# Patient Record
Sex: MALE
[De-identification: names, ages, dates, MRNs, and addresses within clinical notes are randomized per-mention and may not be internally consistent; named-entity substitution may affect disease eponyms.]

---

## 2018-05-11 ENCOUNTER — HOSPITAL ENCOUNTER (INPATIENT)
Dept: HOSPITAL 31 - C.ER | Age: 58
LOS: 3 days | Discharge: HOME | DRG: 183 | End: 2018-05-14
Attending: SPECIALIST | Admitting: SPECIALIST
Payer: MEDICAID

## 2018-05-11 DIAGNOSIS — Z79.4: ICD-10-CM

## 2018-05-11 DIAGNOSIS — E78.5: ICD-10-CM

## 2018-05-11 DIAGNOSIS — N40.0: ICD-10-CM

## 2018-05-11 DIAGNOSIS — E11.9: ICD-10-CM

## 2018-05-11 DIAGNOSIS — M54.5: ICD-10-CM

## 2018-05-11 DIAGNOSIS — F41.9: ICD-10-CM

## 2018-05-11 DIAGNOSIS — I16.9: ICD-10-CM

## 2018-05-11 DIAGNOSIS — N43.3: ICD-10-CM

## 2018-05-11 DIAGNOSIS — K57.32: Primary | ICD-10-CM

## 2018-05-11 LAB
ALBUMIN SERPL-MCNC: 4.1 G/DL (ref 3.5–5)
ALBUMIN/GLOB SERPL: 1.1 {RATIO} (ref 1–2.1)
ALT SERPL-CCNC: 36 U/L (ref 21–72)
APTT BLD: 31 SECONDS (ref 21–34)
AST SERPL-CCNC: 21 U/L (ref 17–59)
BACTERIA #/AREA URNS HPF: (no result) /[HPF]
BASOPHILS # BLD AUTO: 0 K/UL (ref 0–0.2)
BASOPHILS NFR BLD: 0.4 % (ref 0–2)
BILIRUB UR-MCNC: NEGATIVE MG/DL
BUN SERPL-MCNC: 25 MG/DL (ref 9–20)
CALCIUM SERPL-MCNC: 9.8 MG/DL (ref 8.6–10.4)
EOSINOPHIL # BLD AUTO: 0.8 K/UL (ref 0–0.7)
EOSINOPHIL NFR BLD: 6.6 % (ref 0–4)
ERYTHROCYTE [DISTWIDTH] IN BLOOD BY AUTOMATED COUNT: 13 % (ref 11.5–14.5)
GFR NON-AFRICAN AMERICAN: 57
GLUCOSE UR STRIP-MCNC: (no result) MG/DL
HGB BLD-MCNC: 15.3 G/DL (ref 12–18)
INR PPP: 1
LEUKOCYTE ESTERASE UR-ACNC: (no result) LEU/UL
LIPASE: 344 U/L (ref 23–300)
LYMPHOCYTES # BLD AUTO: 1.9 K/UL (ref 1–4.3)
LYMPHOCYTES NFR BLD AUTO: 16.6 % (ref 20–40)
MCH RBC QN AUTO: 35 PG (ref 27–31)
MCHC RBC AUTO-ENTMCNC: 34.8 G/DL (ref 33–37)
MCV RBC AUTO: 100.6 FL (ref 80–94)
MONOCYTES # BLD: 0.7 K/UL (ref 0–0.8)
MONOCYTES NFR BLD: 6.1 % (ref 0–10)
NEUTROPHILS # BLD: 8.1 K/UL (ref 1.8–7)
NEUTROPHILS NFR BLD AUTO: 70.3 % (ref 50–75)
NRBC BLD AUTO-RTO: 0.1 % (ref 0–2)
PH UR STRIP: 5 [PH] (ref 5–8)
PLATELET # BLD: 251 K/UL (ref 130–400)
PMV BLD AUTO: 10.1 FL (ref 7.2–11.7)
PROT UR STRIP-MCNC: (no result) MG/DL
PROTHROMBIN TIME: 11 SECONDS (ref 9.7–12.2)
RBC # BLD AUTO: 4.36 MIL/UL (ref 4.4–5.9)
RBC # UR STRIP: NEGATIVE /UL
SP GR UR STRIP: 1.02 (ref 1–1.03)
SQUAMOUS EPITHIAL: < 1 /HPF (ref 0–5)
UROBILINOGEN UR-MCNC: NORMAL MG/DL (ref 0.2–1)
WBC # BLD AUTO: 11.5 K/UL (ref 4.8–10.8)

## 2018-05-11 NOTE — C.PDOC
History Of Present Illness


57 year old male with a Hx of HTN presents to the ER with a complaint of left 

sided back/flank pain for the past week that has worsened today. Patient 

reports he has hematuria last week. Denies fever or other complaints.


Time Seen by Provider: 05/11/18 21:17


Chief Complaint (Nursing): Back Pain


History Per: Patient


History/Exam Limitations: no limitations


Onset/Duration Of Symptoms: Days


Current Symptoms Are (Timing): Still Present


Quality Of Discomfort: Unable To Describe


Previous Symptoms: None


Associated Symptoms: None


Exacerbating Factor(s): Nothing


Recent travel outside of the [place]: No





Past Medical History


Reviewed: Historical Data, Nursing Documentation, Vital Signs


Vital Signs: 


 Last Vital Signs











Temp  98.1 F   05/14/18 07:00


 


Pulse  88   05/14/18 07:00


 


Resp  20   05/14/18 07:00


 


BP  117/69   05/14/18 07:00


 


Pulse Ox  97   05/14/18 07:00














- Medical History


PMH: HTN, Hypercholesterolemia


Family History: States: Unknown Family Hx





- Social History


Hx Alcohol Use: No


Hx Substance Use: No





- Immunization History


Hx Tetanus Toxoid Vaccination: No


Hx Influenza Vaccination: No


Hx Pneumococcal Vaccination: No





Review Of Systems


Constitutional: Negative for: Fever


Cardiovascular: Negative for: Chest Pain, Palpitations


Respiratory: Negative for: Cough, Shortness of Breath


Genitourinary: Positive for: Hematuria


Musculoskeletal: Positive for: Back Pain (Left)





Physical Exam





- Physical Exam


Appears: Non-toxic


Skin: Normal Color, Warm, Dry


Head: Atraumatic, Normacephalic


Eye(s): bilateral: Normal Inspection


Oral Mucosa: Moist


Chest: Symmetrical, No Tenderness


Cardiovascular: Rhythm Regular


Respiratory: Normal Breath Sounds, No Rales, No Rhonchi, No Wheezing


Gastrointestinal/Abdominal: Soft, No Tenderness


Back: Other (Left sided flank tenderness)


Neurological/Psych: Oriented x3, Normal Speech





ED Course And Treatment





- Laboratory Results


Result Diagrams: 


 05/14/18 07:23





 05/14/18 07:23


O2 Sat by Pulse Oximetry: 97 (Room air)


Pulse Ox Interpretation: Normal





Medical Decision Making


Medical Decision Making: 


ro renal colic, uti- labs imaging pending





Plan:


* CT abd/pel


* Blood work


* Urinalysis


* Morphine











ct shows e/o of diverticulitis- discussedw tih pmd. persistent pain, needs iv 

pain meds, and admisison. 





Disposition





- Disposition


Disposition: HOSPITALIZED


Disposition Time: 01:00


Condition: STABLE





- Clinical Impression


Clinical Impression: 


 Sigmoid diverticulitis








- Scribe Statement


The provider has reviewed the documentation as recorded by the Scribjanie Adams





All medical record entries made by the Scribe were at my direction and 

personally dictated by me. I have reviewed the chart and agree that the record 

accurately reflects my personal performance of the history, physical exam, 

medical decision making, and the department course for this patient. I have 

also personally directed, reviewed, and agree with the discharge instructions 

and disposition.





Decision To Admit





- Pt Status Changed To:


Hospital Disposition Of: Inpatient





- Admit Certification


Admit to Inpatient:: After my assessment, the patient will require 

hospitalization for at least two midnights.  This is because of the severity of 

symptoms shown, intensity of services needed, and/or the medical risk in this 

patient being treated as an outpatient.





- InPatient:


Physician Admission Certification:: needs iv antibiotics persistent pain





- .


Bed Request Type: Regular


Admitting Physician: Diomedes Arrington


Patient Diagnosis: 


 Sigmoid diverticulitis

## 2018-05-11 NOTE — CT
EXAM:

  CT Abdomen and Pelvis Without Intravenous  Contrast



CLINICAL HISTORY:

  57 years old, male; Pain; Abdominal pain; Flank; Lower; Additional info: Abd 

pain



TECHNIQUE:

  Axial computed tomography images of the abdomen and pelvis without 

intravenous contrast.  All CT scans at this facility use one or more dose 

reduction techniques, viz.: automated exposure control; ma/kV adjustment per 

patient size (including targeted exams where dose is matched to indication; 

i.e. head); or iterative reconstruction technique.

  Coronal and sagittal reformatted images were created and reviewed.



COMPARISON:

  No relevant prior studies available.



FINDINGS:

  Lung bases:  Unremarkable.  No mass.  No consolidation.

  Heart:  Mild cardiomegaly.



 ABDOMEN:

  Liver:  Hepatic steatosis.

  Gallbladder and bile ducts:  Unremarkable.  No calcified stones.  No ductal 

dilation.

  Pancreas:  Unremarkable.  No ductal dilation.

  Spleen:  Unremarkable.  No splenomegaly.

  Adrenals:  Unremarkable.  No mass.

  Kidneys and ureters:  Unremarkable.  No obstructing stones.  No 

hydronephrosis.

  Stomach and bowel:  Sigmoid diverticulosis.  Pericolonic inflammation.



 PELVIS:

  Appendix:  No findings to suggest acute appendicitis.

  Bladder:  Nonspecific bladder wall thickening.  No stones.

  Reproductive:  Enlarged prostate.  Left hydrocele.



 ABDOMEN and PELVIS:

  Intraperitoneal space:  Unremarkable.  No free air.  No significant fluid 

collection.

  Bones/joints:  No acute fracture.  No dislocation.

  Soft tissues:  Unremarkable.

  Vasculature:  Atherosclerotic vascular disease.  No abdominal aortic aneurysm.

  Lymph nodes:  Mildly enlarged pericolonic lymph nodes.



IMPRESSION:     

1.  CT findings consistent with acute sigmoid diverticulitis.

2.  Remainder of findings as above.

## 2018-05-12 LAB
% IRON SATURATION: 32 (ref 20–55)
ALBUMIN SERPL-MCNC: 3.8 G/DL (ref 3.5–5)
ALBUMIN/GLOB SERPL: 1.2 {RATIO} (ref 1–2.1)
ALT SERPL-CCNC: 21 U/L (ref 21–72)
AST SERPL-CCNC: 27 U/L (ref 17–59)
BASOPHILS # BLD AUTO: 0 K/UL (ref 0–0.2)
BASOPHILS NFR BLD: 0.3 % (ref 0–2)
BUN SERPL-MCNC: 21 MG/DL (ref 9–20)
CALCIUM SERPL-MCNC: 8.7 MG/DL (ref 8.6–10.4)
EOSINOPHIL # BLD AUTO: 0.6 K/UL (ref 0–0.7)
EOSINOPHIL NFR BLD: 5.9 % (ref 0–4)
ERYTHROCYTE [DISTWIDTH] IN BLOOD BY AUTOMATED COUNT: 13.2 % (ref 11.5–14.5)
FERRITIN SERPL-MCNC: 72.1 NG/ML
FOLATE SERPL-MCNC: 7.6 NG/ML
GFR NON-AFRICAN AMERICAN: > 60
HDLC SERPL-MCNC: 26 MG/DL (ref 30–70)
HGB BLD-MCNC: 14.2 G/DL (ref 12–18)
IRON SERPL-MCNC: 99 UG/DL (ref 49–181)
LDLC SERPL-MCNC: 126 MG/DL (ref 0–129)
LYMPHOCYTES # BLD AUTO: 2.4 K/UL (ref 1–4.3)
LYMPHOCYTES NFR BLD AUTO: 23.3 % (ref 20–40)
MCH RBC QN AUTO: 35.7 PG (ref 27–31)
MCHC RBC AUTO-ENTMCNC: 35.4 G/DL (ref 33–37)
MCV RBC AUTO: 100.8 FL (ref 80–94)
MONOCYTES # BLD: 0.9 K/UL (ref 0–0.8)
MONOCYTES NFR BLD: 8.9 % (ref 0–10)
NEUTROPHILS # BLD: 6.3 K/UL (ref 1.8–7)
NEUTROPHILS NFR BLD AUTO: 61.6 % (ref 50–75)
NRBC BLD AUTO-RTO: 0.1 % (ref 0–2)
PLATELET # BLD: 227 K/UL (ref 130–400)
PMV BLD AUTO: 10 FL (ref 7.2–11.7)
RBC # BLD AUTO: 3.98 MIL/UL (ref 4.4–5.9)
TIBC SERPL-MCNC: 311 UG/DL (ref 250–450)
VIT B12 SERPL-MCNC: 446 PG/ML (ref 239–931)
WBC # BLD AUTO: 10.3 K/UL (ref 4.8–10.8)

## 2018-05-12 RX ADMIN — METOPROLOL SUCCINATE SCH MG: 50 TABLET, EXTENDED RELEASE ORAL at 21:28

## 2018-05-12 RX ADMIN — WATER SCH MLS/HR: 1 INJECTION INTRAMUSCULAR; INTRAVENOUS; SUBCUTANEOUS at 14:38

## 2018-05-12 RX ADMIN — SODIUM CHLORIDE SCH MLS/HR: 9 INJECTION, SOLUTION INTRAVENOUS at 06:10

## 2018-05-12 RX ADMIN — WATER SCH MLS/HR: 1 INJECTION INTRAMUSCULAR; INTRAVENOUS; SUBCUTANEOUS at 21:32

## 2018-05-12 RX ADMIN — SODIUM CHLORIDE SCH MLS/HR: 9 INJECTION, SOLUTION INTRAVENOUS at 11:52

## 2018-05-12 RX ADMIN — WATER SCH MLS/HR: 1 INJECTION INTRAMUSCULAR; INTRAVENOUS; SUBCUTANEOUS at 05:00

## 2018-05-12 RX ADMIN — PANTOPRAZOLE SODIUM SCH MG: 40 TABLET, DELAYED RELEASE ORAL at 09:58

## 2018-05-12 RX ADMIN — ENOXAPARIN SODIUM SCH MG: 40 INJECTION SUBCUTANEOUS at 10:00

## 2018-05-12 RX ADMIN — SODIUM CHLORIDE SCH MLS/HR: 9 INJECTION, SOLUTION INTRAVENOUS at 18:48

## 2018-05-12 NOTE — CP.PCM.CON
History of Present Illness





- History of Present Illness


History of Present Illness: 





This is a 57 year old man with abdominal pain.





Patient presented 5/11/18 with a one week history of left flank pain, which 

became progressively worse on the day of admission.  He denies having nausea, 

vomiting, fever, difficulty swallowing, and heartburn.  He reports having 

diarrhea after eating, three or four times a day.  He denies having 

constipation and rectal bleeding.





On evaluation in the ER, patient was afebrile.  The WBC was slightly elevated 

at 11,500.  CT scan of the abdomen showed sigmoid diverticulosis and 

pericolonic inflammatory changes consistent with diverticulitis.





Review of Systems





- Review of Systems


All systems: reviewed and no additional remarkable complaints except





- Constitutional


Constitutional: absent: Fever





- Cardiovascular


Cardiovascular: absent: Chest Pain, Palpitations





- Respiratory


Respiratory: absent: Cough, Dyspnea





- Gastrointestinal


Gastrointestinal: Abdominal Pain, Diarrhea.  absent: Constipation, Dysphagia, 

Heartburn, Hematochezia, Nausea, Vomiting





- Genitourinary


Genitourinary: Hematuria





- Musculoskeletal


Musculoskeletal: Back Pain





Past Patient History





- Past Medical History & Family History


Past Medical History?: Yes





- Past Social History


Smoking Status: Never Smoked





- CARDIAC


Hx Cardiac Disorders: Yes


Hx Hypercholesterolemia: Yes


Hx Hypertension: Yes





- PULMONARY


Hx Respiratory Disorders: No





- NEUROLOGICAL


Hx Neurological Disorder: No





- HEENT


Hx HEENT Problems: No





- RENAL


Hx Chronic Kidney Disease: No





- ENDOCRINE/METABOLIC


Hx Endocrine Disorders: Yes


Hx Diabetes Mellitus Type 2: Yes





- HEMATOLOGICAL/ONCOLOGICAL


Hx Blood Disorders: No





- INTEGUMENTARY


Hx Dermatological Problems: No





- MUSCULOSKELETAL/RHEUMATOLOGICAL


Hx Musculoskeletal Disorders: Yes


Hx Falls: Yes


Other/Comment: Lt rib fractured 03/2018





- GASTROINTESTINAL


Hx Gastrointestinal Disorders: No





- GENITOURINARY/GYNECOLOGICAL


Hx Genitourinary Disorders: No





- PSYCHIATRIC


Hx Psychophysiologic Disorder: No


Hx Substance Use: No





- SURGICAL HISTORY


Hx Surgeries: No





- ANESTHESIA


Hx Anesthesia: No





Meds


Allergies/Adverse Reactions: 


 Allergies











Allergy/AdvReac Type Severity Reaction Status Date / Time


 


No Known Allergies Allergy   Verified 05/11/18 21:13














- Medications


Medications: 


 Current Medications





Aspirin (Ecotrin)  [facility]ILY [facility]


   Last Admin: 05/12/18 09:57 Dose:  81 mg


Baclofen (Lioresal)  10 mg PO Q12 PRN


   PRN Reason: Pain, moderate (4-7)


Enoxaparin Sodium (Lovenox)  40 mg SC DAILY [facility]


   Last Admin: 05/12/18 10:00 Dose:  40 mg


Gabapentin (Neurontin)  100 mg PO [facility]


Glimepiride (Amaryl)  2 mg PO DAILY [facility]


   Last Admin: 05/12/18 10:23 Dose:  Not Given


Home Med (Nabumetone [Relafen])  500 mg PO Q12 PRN


   PRN Reason: Pain, Mild (1-3)


Hydralazine HCl (Apresoline)  10 mg PO DAILY [facility]


   Last Admin: 05/12/18 09:59 Dose:  10 mg


Sodium Chloride (Sodium Chloride 0.9%)  1,000 mls @ 100 mls/hr IV .Q10H [facility]


   Last Admin: 05/12/18 10:01 Dose:  100 mls/hr


Metronidazole (Flagyl)  500 mg in 100 mls @ 100 mls/hr IVPB Q8 [facility]


   PRN Reason: Protocol


   Last Admin: 05/12/18 05:00 Dose:  100 mls/hr


Piperacillin Sod/Tazobactam (Sod 3.375 gm/ Sodium Chloride)  100 mls @ 200 mls/

hr IVPB Q6H [facility]


   PRN Reason: Protocol


   Last Admin: 05/12/18 06:10 Dose:  200 mls/hr


Lisinopril (Zestril)  20 mg PO DAILY [facility]


   Last Admin: 05/12/18 10:23 Dose:  20 mg


Metoprolol Tartrate (Lopressor)  50 mg PO DAILY [facility]


   Last Admin: 05/12/18 09:58 Dose:  50 mg


Morphine Sulfate (Morphine)  3 mg IVP Q6 PRN


   PRN Reason: Pain, severe (8-10)


   Last Admin: 05/12/18 09:58 Dose:  3 mg


Pantoprazole Sodium (Protonix Ec Tab)  40 mg PO DAILY [facility]


   Last Admin: 05/12/18 09:58 Dose:  40 mg


Pneumococcal Polyvalent Vaccine (Pneumovax 23 Vaccine)  0.5 ml IM .ONCE ONE


   Stop: 05/13/18 10:01


Sertraline HCl (Zoloft)  50 mg PO [facility]











Physical Exam





- Constitutional


Appears: No Acute Distress





- Head Exam


Head Exam: ATRAUMATIC





- Eye Exam


Eye Exam: EOMI, PERRL





- Neck Exam


Neck exam: Negative for: Lymphadenopathy, Thyromegaly





- Respiratory Exam


Respiratory Exam: NORMAL BREATHING PATTERN.  absent: Rales, Rhonchi, Wheezes





- Cardiovascular Exam


Cardiovascular Exam: REGULAR RHYTHM, +S1, +S2.  absent: Gallop, Rubs, Systolic 

Murmur





- GI/Abdominal Exam


GI & Abdominal Exam: Normal Bowel Sounds, Soft.  absent: Mass, Organomegaly, 

Tenderness





- Rectal Exam


Rectal Exam: Deferred





- Extremities Exam


Extremities exam: Negative for: calf tenderness, pedal edema





Results





- Vital Signs


Recent Vital Signs: 


 Last Vital Signs











Temp  98.2 F   05/12/18 07:37


 


Pulse  86   05/12/18 07:37


 


Resp  20   05/12/18 07:37


 


BP  168/93 H  05/12/18 07:37


 


Pulse Ox  96   05/12/18 07:37














- Labs


Result Diagrams: 


 05/12/18 07:46





 05/12/18 07:46


Labs: 


 Laboratory Results - last 24 hr











  05/11/18 05/11/18 05/11/18





  21:44 21:44 21:44


 


WBC  11.5 H  


 


RBC  4.36 L  


 


Hgb  15.3  


 


Hct  43.9  


 


MCV  100.6 H  


 


MCH  35.0 H  


 


MCHC  34.8  


 


RDW  13.0  


 


Plt Count  251  


 


MPV  10.1  


 


Neut % (Auto)  70.3  


 


Lymph % (Auto)  16.6 L  


 


Mono % (Auto)  6.1  


 


Eos % (Auto)  6.6 H  


 


Baso % (Auto)  0.4  


 


Neut # (Auto)  8.1 H  


 


Lymph # (Auto)  1.9  


 


Mono # (Auto)  0.7  


 


Eos # (Auto)  0.8 H  


 


Baso # (Auto)  0.0  


 


PT   11.0 


 


INR   1.0 


 


APTT   31 


 


Sodium    143


 


Potassium    3.9


 


Chloride    100


 


Carbon Dioxide    27


 


Anion Gap    20


 


BUN    25 H


 


Creatinine    1.3


 


Est GFR ( Amer)    > 60


 


Est GFR (Non-Af Amer)    57


 


Random Glucose    167 H


 


Calcium    9.8


 


Total Bilirubin    0.9


 


AST    21


 


ALT    36


 


Alkaline Phosphatase    89


 


Total Protein    7.7


 


Albumin    4.1


 


Globulin    3.6


 


Albumin/Globulin Ratio    1.1


 


Triglycerides   


 


Cholesterol   


 


LDL Cholesterol Direct   


 


HDL Cholesterol   


 


Lipase    344 H


 


Urine Color   


 


Urine Clarity   


 


Urine pH   


 


Ur Specific Gravity   


 


Urine Protein   


 


Urine Glucose (UA)   


 


Urine Ketones   


 


Urine Blood   


 


Urine Nitrate   


 


Urine Bilirubin   


 


Urine Urobilinogen   


 


Ur Leukocyte Esterase   


 


Urine WBC (Auto)   


 


Urine RBC (Auto)   


 


Ur Squamous Epith Cells   


 


Urine Bacteria   


 


Hyaline Casts   














  05/11/18 05/12/18 05/12/18





  22:36 07:46 07:46


 


WBC   10.3 


 


RBC   3.98 L 


 


Hgb   14.2 


 


Hct   40.1 


 


MCV   100.8 H 


 


MCH   35.7 H 


 


MCHC   35.4 


 


RDW   13.2 


 


Plt Count   227 


 


MPV   10.0 


 


Neut % (Auto)   61.6 


 


Lymph % (Auto)   23.3 


 


Mono % (Auto)   8.9 


 


Eos % (Auto)   5.9 H 


 


Baso % (Auto)   0.3 


 


Neut # (Auto)   6.3 


 


Lymph # (Auto)   2.4 


 


Mono # (Auto)   0.9 H 


 


Eos # (Auto)   0.6 


 


Baso # (Auto)   0.0 


 


PT   


 


INR   


 


APTT   


 


Sodium    144


 


Potassium    4.0


 


Chloride    101


 


Carbon Dioxide    29


 


Anion Gap    19


 


BUN    21 H


 


Creatinine    1.2


 


Est GFR ( Amer)    > 60


 


Est GFR (Non-Af Amer)    > 60


 


Random Glucose    148 H


 


Calcium    8.7


 


Total Bilirubin    0.8


 


AST    27


 


ALT    21  D


 


Alkaline Phosphatase    77


 


Total Protein    7.0


 


Albumin    3.8


 


Globulin    3.2


 


Albumin/Globulin Ratio    1.2


 


Triglycerides    209 H


 


Cholesterol    185


 


LDL Cholesterol Direct    126


 


HDL Cholesterol    26 L


 


Lipase   


 


Urine Color  Brisa  


 


Urine Clarity  Hazy  


 


Urine pH  5.0  


 


Ur Specific Gravity  1.025  


 


Urine Protein  2+ H  


 


Urine Glucose (UA)  1+ H  


 


Urine Ketones  Negative  


 


Urine Blood  Negative  


 


Urine Nitrate  Negative  


 


Urine Bilirubin  Negative  


 


Urine Urobilinogen  Normal  


 


Ur Leukocyte Esterase  Neg  


 


Urine WBC (Auto)  1  


 


Urine RBC (Auto)  3  


 


Ur Squamous Epith Cells  < 1  


 


Urine Bacteria  Rare  


 


Hyaline Casts  11-20 H  














Assessment & Plan


(1) Sigmoid diverticulitis


Assessment and Plan: 


Patient presented with a one-week history of abdominal pain, leukocytosis, 

changes of diverticulitis on CT scan.  Zosyn and metronidazole have been 

started.  Patient also reports having diarrhea, and stool studies will be 

ordered.  Clear liquid diet will be ordered.


Status: Acute

## 2018-05-13 VITALS — OXYGEN SATURATION: 97 % | RESPIRATION RATE: 20 BRPM

## 2018-05-13 LAB
ALBUMIN SERPL-MCNC: 4.1 G/DL (ref 3.5–5)
ALBUMIN/GLOB SERPL: 1.2 {RATIO} (ref 1–2.1)
ALT SERPL-CCNC: 29 U/L (ref 21–72)
AST SERPL-CCNC: 32 U/L (ref 17–59)
BASOPHILS # BLD AUTO: 0 K/UL (ref 0–0.2)
BASOPHILS NFR BLD: 0.3 % (ref 0–2)
BUN SERPL-MCNC: 19 MG/DL (ref 9–20)
CALCIUM SERPL-MCNC: 9 MG/DL (ref 8.6–10.4)
EOSINOPHIL # BLD AUTO: 0.5 K/UL (ref 0–0.7)
EOSINOPHIL NFR BLD: 4.7 % (ref 0–4)
ERYTHROCYTE [DISTWIDTH] IN BLOOD BY AUTOMATED COUNT: 12.9 % (ref 11.5–14.5)
GFR NON-AFRICAN AMERICAN: > 60
HGB BLD-MCNC: 15.1 G/DL (ref 12–18)
LYMPHOCYTES # BLD AUTO: 1.9 K/UL (ref 1–4.3)
LYMPHOCYTES NFR BLD AUTO: 19.9 % (ref 20–40)
MCH RBC QN AUTO: 35.7 PG (ref 27–31)
MCHC RBC AUTO-ENTMCNC: 35.1 G/DL (ref 33–37)
MCV RBC AUTO: 101.6 FL (ref 80–94)
MONOCYTES # BLD: 0.8 K/UL (ref 0–0.8)
MONOCYTES NFR BLD: 8.5 % (ref 0–10)
NEUTROPHILS # BLD: 6.3 K/UL (ref 1.8–7)
NEUTROPHILS NFR BLD AUTO: 66.6 % (ref 50–75)
NRBC BLD AUTO-RTO: 0 % (ref 0–2)
PLATELET # BLD: 260 K/UL (ref 130–400)
PMV BLD AUTO: 9.7 FL (ref 7.2–11.7)
RBC # BLD AUTO: 4.23 MIL/UL (ref 4.4–5.9)
WBC # BLD AUTO: 9.5 K/UL (ref 4.8–10.8)

## 2018-05-13 RX ADMIN — ENOXAPARIN SODIUM SCH MG: 40 INJECTION SUBCUTANEOUS at 09:16

## 2018-05-13 RX ADMIN — TAZOBACTAM SODIUM AND PIPERACILLIN SODIUM SCH MLS/HR: 375; 3 INJECTION, SOLUTION INTRAVENOUS at 07:04

## 2018-05-13 RX ADMIN — WATER SCH MLS/HR: 1 INJECTION INTRAMUSCULAR; INTRAVENOUS; SUBCUTANEOUS at 13:35

## 2018-05-13 RX ADMIN — WATER SCH MLS/HR: 1 INJECTION INTRAMUSCULAR; INTRAVENOUS; SUBCUTANEOUS at 05:52

## 2018-05-13 RX ADMIN — METOPROLOL SUCCINATE SCH MG: 50 TABLET, EXTENDED RELEASE ORAL at 09:15

## 2018-05-13 RX ADMIN — NAPROXEN SODIUM SCH MG: 550 TABLET ORAL at 13:35

## 2018-05-13 RX ADMIN — TAZOBACTAM SODIUM AND PIPERACILLIN SODIUM SCH MLS/HR: 375; 3 INJECTION, SOLUTION INTRAVENOUS at 12:55

## 2018-05-13 RX ADMIN — SODIUM CHLORIDE SCH MLS/HR: 9 INJECTION, SOLUTION INTRAVENOUS at 00:07

## 2018-05-13 RX ADMIN — NAPROXEN SODIUM SCH MG: 550 TABLET ORAL at 21:20

## 2018-05-13 RX ADMIN — METOPROLOL SUCCINATE SCH MG: 50 TABLET, EXTENDED RELEASE ORAL at 21:21

## 2018-05-13 RX ADMIN — WATER SCH MLS/HR: 1 INJECTION INTRAMUSCULAR; INTRAVENOUS; SUBCUTANEOUS at 21:22

## 2018-05-13 RX ADMIN — TAZOBACTAM SODIUM AND PIPERACILLIN SODIUM SCH MLS/HR: 375; 3 INJECTION, SOLUTION INTRAVENOUS at 18:32

## 2018-05-13 RX ADMIN — PANTOPRAZOLE SODIUM SCH MG: 40 TABLET, DELAYED RELEASE ORAL at 09:15

## 2018-05-13 NOTE — HP
HISTORY OF PRESENT ILLNESS:  This is a 57-year-old male with history of

hypertension, anxiety disorder, and hyperlipidemia.  The patient came to

the hospital because of complaining of severe lower abdominal pain and

nausea.  The patient has pain on and off for the last week, mostly located

in the left flank, and the patient is still passing bloody urine last week,

but today during admission, the patient has this severe pain and came to

the hospital and patient was evaluated in the emergency room and the

patient was admitted.



PAST MEDICAL HISTORY:  High blood pressure, history of hypertension,

hyperlipidemia, and anxiety disorder.



SOCIAL HISTORY:  The patient is , has children.  No alcohol abuse. 

No substance use.



REVIEW OF SYSTEMS:

RESPIRATORY:  The patient denies any shortness of breath.

CARDIOVASCULAR:  The patient denies any chest pain or palpitations.

GI:  Nausea and abdominal pain.

:  The patient denies any dysuria.

NEUROLOGIC:  The patient is weak.

PSYCHIATRIC:  The patient appears to being very anxious as he feels that

his head is exploding.



PHYSICAL EXAMINATION:

GENERAL:  The patient is alert, awake, and oriented x3.

VITAL SIGNS:  The patient has now blood pressure 199/123, pulse is 86,

respirations 20, and temperature was 98.2.

NECK:  Supple.

HEENT:  Head is normocephalic.

LUNGS:  Clear.

HEART:  Regular rate and rhythm.  Positive S1 and S2.  No S3 gallop rhythm.

Positive murmur.

ABDOMEN:  Soft.  Mild tenderness in the left lower quadrant and positive

bowel sounds.

EXTREMITIES:  Legs, no edema.



LABORATORY DATA:  The patient has some tests done.  WBC was 11.5,

hemoglobin 16.2, hematocrit is 42.9, and platelets 151.  On admission, the

chemistry showed sodium 142, potassium 3.9, chloride 100, bicarb is 27, BUN

25, creatinine 1.3, glucose is 167, and lipase 344.  The patient also has a

CAT scan of the abdomen and pelvis that was significant for acute sigmoid

diverticulitis.



IMPRESSION:  The patient admitted with the diagnoses of,

1.  Acute sigmoid diverticulitis.

2.  Hypertensive crisis.

3.  Anxiety.



PLAN:  So, the patient will have a consult with Dr. Alicia Torres of ID and

Dr. Weiner of GI.  Plan is that we are going adjust the blood pressure

medication and hydralazine will be two times a day.  The patient is going

to start with clonidine and we are going to _____ at this point and the

case was reviewed and discussed with the nurse, and also the metoprolol

will be increased to 50 mg every 12 hours and hydralazine also 2 times a

day.  Lasix will be given twice a day.







__________________________________________

Diomedes Arrington MD



DD:  05/12/2018 18:09:48

DT:  05/12/2018 21:55:47

Job # [id number]

## 2018-05-13 NOTE — CP.PCM.PN
Subjective





- Date & Time of Evaluation


Date of Evaluation: 05/13/18


Time of Evaluation: 10:19





- Subjective


Subjective: 





Patient states that the pain continues to improve.  He is tolerating the clear 

liquid diet.  He denies having nausea and vomiting.  He has not had any bowel 

movements so far today.





Objective





- Vital Signs/Intake and Output


Vital Signs (last 24 hours): 


 











Temp Pulse Resp BP Pulse Ox


 


 98.0 F   74   18   144/71   98 


 


 05/13/18 07:20  05/13/18 07:25  05/13/18 07:20  05/13/18 07:20  05/13/18 07:20








Intake and Output: 


 











 05/13/18 05/13/18





 06:59 18:59


 


Output Total 700 


 


Balance -700 














- Medications


Medications: 


 Current Medications





Aspirin (Ecotrin)  81 mg PO DAILY [facility]


   Last Admin: 05/13/18 09:19 Dose:  81 mg


Baclofen (Lioresal)  10 mg PO Q12 PRN


   PRN Reason: Pain, moderate (4-7)


Enoxaparin Sodium (Lovenox)  40 mg SC DAILY [facility]


   Last Admin: 05/13/18 09:16 Dose:  40 mg


Gabapentin (Neurontin)  100 mg PO HS [facility]


   Last Admin: 05/12/18 21:28 Dose:  100 mg


Glimepiride (Amaryl)  2 mg PO DAILY [facility]


   Last Admin: 05/13/18 09:15 Dose:  2 mg


Home Med (Nabumetone [Relafen])  500 mg PO Q12 PRN


   PRN Reason: Pain, Mild (1-3)


Hydralazine HCl (Apresoline)  50 mg PO Q8 [facility]


   Last Admin: 05/13/18 05:51 Dose:  50 mg


Metronidazole (Flagyl)  500 mg in 100 mls @ 100 mls/hr IVPB Q8 [facility]


   PRN Reason: Protocol


   Last Admin: 05/13/18 05:52 Dose:  100 mls/hr


Piperacillin Sod/Tazobactam Sod (Zosyn 3.375 Gm Iv Premix)  3.375 gm in 50 mls 

@ 100 mls/hr IVPB Q6H [facility]


   PRN Reason: Protocol


   Last Admin: 05/13/18 07:04 Dose:  100 mls/hr


Lisinopril (Zestril)  20 mg PO DAILY [facility]


   Last Admin: 05/13/18 09:15 Dose:  20 mg


Metoprolol Succinate (Toprol Xl)  50 mg PO Q12 [facility]


   Last Admin: 05/13/18 09:15 Dose:  50 mg


Pantoprazole Sodium (Protonix Ec Tab)  40 mg PO DAILY [facility]


   Last Admin: 05/13/18 09:15 Dose:  40 mg


Sertraline HCl (Zoloft)  50 mg PO HS [facility]


   Last Admin: 05/12/18 21:28 Dose:  50 mg











- Labs


Labs: 


 





 05/13/18 08:01 





 05/13/18 08:01 





 











PT  11.0 SECONDS (9.7-12.2)   05/11/18  21:44    


 


INR  1.0   05/11/18  21:44    


 


APTT  31 SECONDS (21-34)   05/11/18  21:44    














- Constitutional


Appears: No Acute Distress





- Head Exam


Head Exam: ATRAUMATIC, NORMOCEPHALIC





- Eye Exam


Eye Exam: EOMI, PERRL





- Neck Exam


Neck Exam: absent: Lymphadenopathy, Thyromegaly





- Respiratory Exam


Respiratory Exam: NORMAL BREATHING PATTERN.  absent: Rales, Rhonchi, Wheezes





- Cardiovascular Exam


Cardiovascular Exam: REGULAR RHYTHM, +S1, +S2.  absent: Gallop, Rubs, Murmur





- GI/Abdominal Exam


GI & Abdominal Exam: Soft, Normal Bowel Sounds.  absent: Tenderness, Mass, 

Organomegaly





- Rectal Exam


Rectal Exam: Deferred





- Extremities Exam


Extremities Exam: absent: Calf Tenderness, Pedal Edema





Assessment and Plan


(1) Sigmoid diverticulitis


Assessment & Plan: 


Patient has less pain and is tolerating the clear liquid diet.  Will advance 

diet and observe.  Patient should have colonoscopy with Dr. Weiner as an 

outpatient in six to eight weeks.


Status: Acute

## 2018-05-13 NOTE — PN
DATE:



SUBJECTIVE:  Today, the patient is alert and awake and has much less pain

to the abdomen and back, negative shortness of breath.  No chest pain.



PHYSICAL EXAMINATION:

VITAL SIGNS:  Today, the blood pressure is 144/71 and normal.  Patient's

pulse is 83, respirations 18, temperature 98 degrees Fahrenheit.

NECK:  Supple.  No JVD.

LUNGS:  Clear.

HEART:  Regular rate and rhythm with positive murmur with a sharp apical

beat.

ABDOMEN:  Soft and obese.  Mild tenderness in the left lower quadrant.

BACK:  Tenderness to the low back.

EXTREMITIES:  There is no edema.



LABORATORY DATA:  Blood tests done that showed that his WBC is 9.5,

hemoglobin 16.3, hematocrit 43, platelet is 260.  Chemistries showed sodium

145, potassium 3.7, chloride 99, bicarb 30, BUN 19, creatinine 1.2, glucose

180, calcium 9.  _____ was 1.2.



PLAN:  We are going to continue medical therapy and labs tomorrow and also

do the lipase and the patient is admitted.





__________________________________________

Diomedes Arrington MD





DD:  05/13/2018 12:49:00

DT:  05/13/2018 13:54:30

Job # [id number]

## 2018-05-14 VITALS — DIASTOLIC BLOOD PRESSURE: 69 MMHG | TEMPERATURE: 98.1 F | SYSTOLIC BLOOD PRESSURE: 117 MMHG

## 2018-05-14 VITALS — HEART RATE: 88 BPM

## 2018-05-14 LAB
ALBUMIN SERPL-MCNC: 4 G/DL (ref 3.5–5)
ALBUMIN/GLOB SERPL: 1.2 {RATIO} (ref 1–2.1)
ALT SERPL-CCNC: 32 U/L (ref 21–72)
AST SERPL-CCNC: 39 U/L (ref 17–59)
BASOPHILS # BLD AUTO: 0 K/UL (ref 0–0.2)
BASOPHILS NFR BLD: 0.4 % (ref 0–2)
BUN SERPL-MCNC: 20 MG/DL (ref 9–20)
C DIFF DNA SPEC QL NAA+PROBE: NEGATIVE
CALCIUM SERPL-MCNC: 9.1 MG/DL (ref 8.6–10.4)
EOSINOPHIL # BLD AUTO: 0.9 K/UL (ref 0–0.7)
EOSINOPHIL NFR BLD: 8.4 % (ref 0–4)
ERYTHROCYTE [DISTWIDTH] IN BLOOD BY AUTOMATED COUNT: 13.1 % (ref 11.5–14.5)
GFR NON-AFRICAN AMERICAN: 57
HGB BLD-MCNC: 15.5 G/DL (ref 12–18)
LIPASE: 106 U/L (ref 23–300)
LYMPHOCYTES # BLD AUTO: 2.3 K/UL (ref 1–4.3)
LYMPHOCYTES NFR BLD AUTO: 22.4 % (ref 20–40)
MCH RBC QN AUTO: 35.6 PG (ref 27–31)
MCHC RBC AUTO-ENTMCNC: 35 G/DL (ref 33–37)
MCV RBC AUTO: 101.6 FL (ref 80–94)
MONOCYTES # BLD: 0.8 K/UL (ref 0–0.8)
MONOCYTES NFR BLD: 8 % (ref 0–10)
NEUTROPHILS # BLD: 6.2 K/UL (ref 1.8–7)
NEUTROPHILS NFR BLD AUTO: 60.8 % (ref 50–75)
NRBC BLD AUTO-RTO: 0.1 % (ref 0–2)
PLATELET # BLD: 261 K/UL (ref 130–400)
PMV BLD AUTO: 9.8 FL (ref 7.2–11.7)
RBC # BLD AUTO: 4.36 MIL/UL (ref 4.4–5.9)
WBC # BLD AUTO: 10.2 K/UL (ref 4.8–10.8)
WBC STL QL MICRO: NEGATIVE

## 2018-05-14 RX ADMIN — ENOXAPARIN SODIUM SCH MG: 40 INJECTION SUBCUTANEOUS at 09:19

## 2018-05-14 RX ADMIN — NAPROXEN SODIUM SCH MG: 550 TABLET ORAL at 09:19

## 2018-05-14 RX ADMIN — METOPROLOL SUCCINATE SCH MG: 50 TABLET, EXTENDED RELEASE ORAL at 09:18

## 2018-05-14 RX ADMIN — WATER SCH MLS/HR: 1 INJECTION INTRAMUSCULAR; INTRAVENOUS; SUBCUTANEOUS at 05:00

## 2018-05-14 RX ADMIN — TAZOBACTAM SODIUM AND PIPERACILLIN SODIUM SCH MLS/HR: 375; 3 INJECTION, SOLUTION INTRAVENOUS at 01:42

## 2018-05-14 RX ADMIN — TAZOBACTAM SODIUM AND PIPERACILLIN SODIUM SCH MLS/HR: 375; 3 INJECTION, SOLUTION INTRAVENOUS at 06:10

## 2018-05-14 RX ADMIN — PANTOPRAZOLE SODIUM SCH MG: 40 TABLET, DELAYED RELEASE ORAL at 09:19

## 2018-05-14 NOTE — RAD
PROCEDURE:  Radiographs of the Lumbar Spine.



HISTORY:

LOW BACKPAIN







COMPARISON:

Correlations made to CT scan of the abdomen pelvis dated 05/11/2018.



FINDINGS:



BONES:

Multilevel anterior endplate osteophytic changes. Normal alignment. 

No listhesis. No fracture.



DISC SPACES:

Mild multilevel narrowing.



OTHER FINDINGS:

None.



IMPRESSION:

No acute fracture.  Multilevel degenerative changes.

## 2018-05-14 NOTE — CP.PCM.PN
Subjective





- Date & Time of Evaluation


Date of Evaluation: 05/14/18


Time of Evaluation: 12:32





- Subjective


Subjective: 


PATIENT WAS ADMITTED FOR DIVERTICULITIS 


DENIES ABD PAIN/ CHEST PAIN OR SOB


NO SIGN OF DISTRESS NOTED





Objective





- Vital Signs/Intake and Output


Vital Signs (last 24 hours): 


 











Temp Pulse Resp BP Pulse Ox


 


 98.1 F   88   20   117/69   97 


 


 05/14/18 07:00  05/14/18 07:00  05/14/18 07:00  05/14/18 07:00  05/14/18 08:48








Intake and Output: 


 











 05/14/18 05/14/18





 06:59 18:59


 


Intake Total 450 


 


Balance 450 














- Medications


Medications: 


 Current Medications





Aspirin (Ecotrin)  81 mg PO DAILY [facility]


   Last Admin: 05/14/18 09:19 Dose:  81 mg


Baclofen (Lioresal)  10 mg PO Q12 PRN


   PRN Reason: Pain, moderate (4-7)


Enoxaparin Sodium (Lovenox)  40 mg SC DAILY [facility]


   Last Admin: 05/14/18 09:19 Dose:  40 mg


Gabapentin (Neurontin)  100 mg PO HS [facility]


   Last Admin: 05/13/18 21:21 Dose:  100 mg


Glimepiride (Amaryl)  2 mg PO DAILY [facility]


   Last Admin: 05/14/18 09:19 Dose:  2 mg


Hydralazine HCl (Apresoline)  50 mg PO Q8 [facility]


   Last Admin: 05/14/18 06:10 Dose:  50 mg


Metronidazole (Flagyl)  500 mg in 100 mls @ 100 mls/hr IVPB Q8 [facility]


   PRN Reason: Protocol


   Last Admin: 05/14/18 05:00 Dose:  100 mls/hr


Piperacillin Sod/Tazobactam Sod (Zosyn 3.375 Gm Iv Premix)  3.375 gm in 50 mls 

@ 100 mls/hr IVPB Q6H [facility]


   PRN Reason: Protocol


   Last Admin: 05/14/18 06:10 Dose:  100 mls/hr


Insulin Aspart (Novolog)  0 unit SC ACHS [facility]


   PRN Reason: Protocol


Lisinopril (Zestril)  20 mg PO DAILY [facility]


   Last Admin: 05/14/18 09:18 Dose:  20 mg


Metoprolol Succinate (Toprol Xl)  50 mg PO Q12 [facility]


   Last Admin: 05/14/18 09:18 Dose:  50 mg


Naproxen (Anaprox Ds)  550 mg PO Q12 [facility]


   Last Admin: 05/14/18 09:19 Dose:  550 mg


Pantoprazole Sodium (Protonix Ec Tab)  40 mg PO DAILY [facility]


   Last Admin: 05/14/18 09:19 Dose:  40 mg


Sertraline HCl (Zoloft)  50 mg PO HS [facility]


   Last Admin: 05/13/18 21:21 Dose:  50 mg











- Labs


Labs: 


 





 05/14/18 07:23 





 05/14/18 07:23 





 











PT  11.0 SECONDS (9.7-12.2)   05/11/18  21:44    


 


INR  1.0   05/11/18  21:44    


 


APTT  31 SECONDS (21-34)   05/11/18  21:44    














Assessment and Plan





- Assessment and Plan (Free Text)


Assessment: 


PATIENT SEEN AND EXAMINED AT THE BEDSIDE


LUNG SOUND CLEAR MINH 


POSITIVE BOWEL SOUND ALL QUADRANT 


PATIENT TOLERATED ADV DIET


LUMBAR X RAY SHOW NO ACUTE FRACTURE/ MULTILEVEL DEGENERATIVE CHANGES


LEFT HYDROCELE NOTED ON THE CT ABD AND PELVIC 


US ORDER  AND PATIENT WILL F/U RESULT AT DR GRANT OFFICE 


LIPASE  


DISCUSS WITH DR GRANT WHO CLEAR PATIENT FOR DC


FOLLOW UP WITH DR GRANT AT HIS OFFICE NEXT ON MONDAY ---CALL FOR APPOINTMENT


   F/U WITH YOUR US RESULT AT YOUR VISIT


FOLLOW UP WITH DR MCKEON AT HIS OFFICE IN 4-6 WEEKS --CALL FOR APPOINTMENT


   FOR OUT PATIENT COLONOSCOPY 


CONTINUE ALL YOUR HOME MEDICATION 


NEW PRESCRIPTION GIVEN


   FLAGYL 500 MG BY MOUTH EVERY 8 HOURS FOR 7 DAYS 


   AUGMENTIN 500 BY MOUTH EVERY 8 HOURS FOR 7 DAYS 


   BACID ONE TAB DAILY FOR 7 DAYS


ACTIVITY AS TOLERATED 


CALL DR GRANT OR GO TO THE EMERGENCY ROOM IF SYMPTOMS RETURN OR WORSENING  


DISCUSS WITH PATIENT WHO AGREE AND VERBALIZED UNDERSTANDING

## 2018-05-14 NOTE — US
HISTORY:

HYDROCELE



TECHNIQUE:

Realtime sonography through the scrotum with color and doppler flow.



COMPARISON:

None Available.



FINDINGS:



RIGHT TESTICLE:

Measures 4.4 x 2.0 x 2.9 cm. Normal echotexture and flow.



RIGHT EPIDIDYMIS:

Normal size, morphology and vascularity. 



LEFT TESTICLE:

Measures 4.3 x 2.2 x 3.5 cm. Normal echotexture and flow.



LEFT EPIDIDYMIS:

The epididymis is not discretely visualized.  There is a large cyst 

in the left hemiscrotum separate from the testicle.  It does not 

surround the testicle and does not represent hydrocele.  This 

measures approximately 3.9 x 7.4 by 6.0 cm.  There are low-level 

internal echoes suggestive of a spermatocele.  There is a solitary 

septations seen within this fluid collection. 



HYDROCELE:

None.



VARICOCELE:

None.



OTHER FINDINGS:

None. 



IMPRESSION:

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 No 

additional abnormality identified.

## 2018-05-15 LAB — PSA SERPL-MCNC: 3.3 NG/ML

## 2018-05-15 NOTE — PN
DATE:  05/14/2018



SUBJECTIVE:  Today, the patient was seen this morning.  Denied any pain of

the abdomen and any shortness of breath.  No chest pain.  No palpitations. 

The patient admitted having much less pain to the lower back.



PHYSICAL EXAMINATION:

VITAL SIGNS:  The patient has blood pressure of 117/69, pulse 84,

respirations 20, temperature 98.1.

HEENT:  Head is normocephalic.

NECK:  Supple.  No JVD.

LUNGS:  Clear.

HEART:  Regular rate and rhythm.  Positive murmur.

ABDOMEN:  Soft, obese, nontender.  No palpable mass.  Flank, no tenderness

noted.

EXTREMITIES:  There is no edema.



LABORATORY DATA:  The patient today had some test done that showed the WBC

is 10.2, hemoglobin is 15.5, hematocrit 44.3 and platelets 261.  The

chemistry showed sodium 146, potassium 3.8, chloride 101, bicarbonate is

29, BUN 20, creatinine 1.3 and glucose 149, calcium is 9.1.  Also the

patient today had a lumbar spine x-ray that has shown no acute fracture,

but multilevel degenerative changes.  The patient also had testicular

ultrasound, that showed that ____ and no additional abnormality identified.



PLAN:  Discussed with Silverio, the nurse practitioner and the patient will be

discharged home on currently antibiotics and rest of the medications.







__________________________________________

Diomedes Arrington MD





DD:  05/14/2018 20:09:18

DT:  05/14/2018 21:57:12

Job # [id number]

## 2018-05-15 NOTE — DS
HOSPITAL COURSE:  This patient is a 57 years old male with history of

hypertension, hyperlipidemia and anxiety disorder.  The patient came to the

emergency room because the patient was complaining of pain in the abdomen

and nausea and severe pain in the lower abdomen, and also patient

complaining of pain to the back.  The patient was evaluated in the

emergency room, had tests done.  The CAT scan was positive for acute

sigmoid diverticulitis.  So the patient was admitted to rule out flare, but

however, I found the patient has a very elevated blood pressure in the

range of 200.  So the patient was transferred to the telemetry and received

medications including hydralazine, Lopressor, Lasix and lisinopril.  The

patient also had some tests done that showed _____ diverticulitis, also the

lactase was slightly elevated.  The patient has received medications and

the abdominal tenderness disappeared, the pain of the back was persistent,

but decreasing.  So the patient had an x-ray of the back that showed

multilevel degenerative changes and the patient at this point, _____ the

patient is able to eat well, no abdominal pain and will be able to be

discharged home on medications including Flagyl and Augmentin.  The case

was discussed with Silverio guerrero nurse practitioner.







__________________________________________

Diomedes Arrington MD





DD:  05/14/2018 20:13:12

DT:  05/14/2018 23:51:43

Job # [id number]